# Patient Record
Sex: FEMALE | Race: WHITE | URBAN - METROPOLITAN AREA
[De-identification: names, ages, dates, MRNs, and addresses within clinical notes are randomized per-mention and may not be internally consistent; named-entity substitution may affect disease eponyms.]

---

## 2022-05-13 ENCOUNTER — OFFICE VISIT (OUTPATIENT)
Dept: URBAN - METROPOLITAN AREA CLINIC 26 | Facility: CLINIC | Age: 77
End: 2022-05-13
Payer: MEDICARE

## 2022-05-13 DIAGNOSIS — H52.4 PRESBYOPIA: ICD-10-CM

## 2022-05-13 DIAGNOSIS — H25.13 AGE-RELATED NUCLEAR CATARACT, BILATERAL: ICD-10-CM

## 2022-05-13 DIAGNOSIS — E11.9 TYPE 2 DIABETES MELLITUS W/O COMPLICATION: Primary | ICD-10-CM

## 2022-05-13 PROCEDURE — 92004 COMPRE OPH EXAM NEW PT 1/>: CPT | Performed by: OPTOMETRIST

## 2022-05-13 ASSESSMENT — KERATOMETRY
OD: 42.75
OS: 42.75

## 2022-05-13 ASSESSMENT — INTRAOCULAR PRESSURE
OS: 16
OD: 18

## 2022-05-13 ASSESSMENT — VISUAL ACUITY
OD: 20/25
OS: 20/25

## 2022-05-13 NOTE — IMPRESSION/PLAN
Impression: Age-related nuclear cataract, bilateral: H25.13. Plan: Recommend cataract surgery OU. Pt would like to wait because she is moving soon. f/u 3mns [place] in [place]/ [place].

## 2022-05-13 NOTE — IMPRESSION/PLAN
Impression: Presbyopia: H52.4. Plan: No Rx given. Continue with current glasses because of upcoming Cat sx.

## 2022-05-13 NOTE — IMPRESSION/PLAN
Impression: Type 2 diabetes mellitus w/o complication: P59.0. Plan: No signs of retinopathy or neovascularization noted. Discussed ocular and systemic benefits of blood sugar control. Continue blood sugar control.  RTC 1yr complete exam

## 2022-08-16 ENCOUNTER — OFFICE VISIT (OUTPATIENT)
Dept: URBAN - NONMETROPOLITAN AREA CLINIC 13 | Facility: CLINIC | Age: 77
End: 2022-08-16
Payer: MEDICARE

## 2022-08-16 DIAGNOSIS — E11.9 TYPE 2 DIABETES MELLITUS W/O COMPLICATION: ICD-10-CM

## 2022-08-16 DIAGNOSIS — H25.813 COMBINED FORMS OF AGE-RELATED CATARACT, BILATERAL: Primary | ICD-10-CM

## 2022-08-16 DIAGNOSIS — H43.813 VITREOUS DEGENERATION, BILATERAL: ICD-10-CM

## 2022-08-16 DIAGNOSIS — H04.123 DRY EYE SYNDROME OF BILATERAL LACRIMAL GLANDS: ICD-10-CM

## 2022-08-16 PROCEDURE — 99214 OFFICE O/P EST MOD 30 MIN: CPT | Performed by: OPTOMETRIST

## 2022-08-16 ASSESSMENT — INTRAOCULAR PRESSURE
OS: 15
OD: 16

## 2022-08-16 ASSESSMENT — VISUAL ACUITY
OD: 20/25
OS: 20/25

## 2022-08-16 NOTE — IMPRESSION/PLAN
Impression: Type 2 diabetes mellitus w/o complication: K14.0.  Plan: NO diabetic retinopathy noted ou, Letter to PCP, yearly exams are indicated

## 2022-09-23 ENCOUNTER — OFFICE VISIT (OUTPATIENT)
Dept: URBAN - NONMETROPOLITAN AREA CLINIC 13 | Facility: CLINIC | Age: 77
End: 2022-09-23
Payer: MEDICARE

## 2022-09-23 DIAGNOSIS — H25.13 AGE-RELATED NUCLEAR CATARACT, BILATERAL: Primary | ICD-10-CM

## 2022-09-23 DIAGNOSIS — E11.9 TYPE 2 DIABETES MELLITUS W/O COMPLICATION: ICD-10-CM

## 2022-09-23 DIAGNOSIS — H25.12 AGE-RELATED NUCLEAR CATARACT, LEFT EYE: ICD-10-CM

## 2022-09-23 DIAGNOSIS — H04.123 DRY EYE SYNDROME OF BILATERAL LACRIMAL GLANDS: ICD-10-CM

## 2022-09-23 PROCEDURE — 99204 OFFICE O/P NEW MOD 45 MIN: CPT | Performed by: OPHTHALMOLOGY

## 2022-09-23 ASSESSMENT — VISUAL ACUITY
OS: 20/25
OD: 20/25

## 2022-09-23 ASSESSMENT — INTRAOCULAR PRESSURE
OD: 15
OS: 16

## 2022-09-23 NOTE — IMPRESSION/PLAN
Impression: Type 2 diabetes mellitus w/o complication: M00.5.  Plan: NO diabetic retinopathy noted ou, Letter to PCP, yearly exams are indicated

## 2022-10-03 ENCOUNTER — TESTING ONLY (OUTPATIENT)
Dept: URBAN - NONMETROPOLITAN AREA CLINIC 13 | Facility: CLINIC | Age: 77
End: 2022-10-03
Payer: MEDICARE

## 2022-10-03 DIAGNOSIS — H25.13 AGE-RELATED NUCLEAR CATARACT, BILATERAL: Primary | ICD-10-CM

## 2022-10-03 DIAGNOSIS — Z01.818 ENCOUNTER FOR OTHER PREPROCEDURAL EXAMINATION: Primary | ICD-10-CM

## 2022-10-03 PROCEDURE — 99203 OFFICE O/P NEW LOW 30 MIN: CPT | Performed by: PHYSICIAN ASSISTANT

## 2022-10-03 PROCEDURE — 92025 CPTRIZED CORNEAL TOPOGRAPHY: CPT | Performed by: OPHTHALMOLOGY

## 2022-10-10 ENCOUNTER — SURGERY (OUTPATIENT)
Dept: URBAN - NONMETROPOLITAN AREA SURGERY 4 | Facility: SURGERY | Age: 77
End: 2022-10-10
Payer: MEDICARE

## 2022-10-10 DIAGNOSIS — H25.13 AGE-RELATED NUCLEAR CATARACT, BILATERAL: Primary | ICD-10-CM

## 2022-10-10 PROCEDURE — 66984 XCAPSL CTRC RMVL W/O ECP: CPT | Performed by: OPHTHALMOLOGY

## 2022-10-11 ENCOUNTER — POST-OPERATIVE VISIT (OUTPATIENT)
Dept: URBAN - NONMETROPOLITAN AREA CLINIC 13 | Facility: CLINIC | Age: 77
End: 2022-10-11
Payer: MEDICARE

## 2022-10-11 DIAGNOSIS — Z48.810 ENCOUNTER FOR SURGICAL AFTERCARE FOLLOWING SURGERY ON A SENSE ORGAN: Primary | ICD-10-CM

## 2022-10-11 PROCEDURE — 99024 POSTOP FOLLOW-UP VISIT: CPT | Performed by: OPTOMETRIST

## 2022-10-11 ASSESSMENT — INTRAOCULAR PRESSURE
OS: 15
OD: 19

## 2022-10-20 ENCOUNTER — POST-OPERATIVE VISIT (OUTPATIENT)
Dept: URBAN - NONMETROPOLITAN AREA CLINIC 14 | Facility: CLINIC | Age: 77
End: 2022-10-20
Payer: MEDICARE

## 2022-10-20 DIAGNOSIS — Z48.810 ENCOUNTER FOR SURGICAL AFTERCARE FOLLOWING SURGERY ON A SENSE ORGAN: Primary | ICD-10-CM

## 2022-10-20 PROCEDURE — 99024 POSTOP FOLLOW-UP VISIT: CPT | Performed by: OPTOMETRIST

## 2022-10-20 ASSESSMENT — INTRAOCULAR PRESSURE
OS: 7
OD: 8

## 2022-10-20 ASSESSMENT — VISUAL ACUITY: OD: 20/20

## 2022-10-20 NOTE — IMPRESSION/PLAN
Impression: S/P Cataract Extraction by phacoemulsification with IOL placement OD - 10 Days. Encounter for surgical aftercare following surgery on a sense organ  Z48.810. Plan: Doing well. Post op instructions discussed. Will RTC if any concerns arise.

## 2022-10-31 ENCOUNTER — SURGERY (OUTPATIENT)
Dept: URBAN - NONMETROPOLITAN AREA SURGERY 4 | Facility: SURGERY | Age: 77
End: 2022-10-31
Payer: MEDICARE

## 2022-10-31 DIAGNOSIS — H25.12 AGE-RELATED NUCLEAR CATARACT, LEFT EYE: Primary | ICD-10-CM

## 2022-10-31 PROCEDURE — 66984 XCAPSL CTRC RMVL W/O ECP: CPT | Performed by: OPHTHALMOLOGY

## 2022-11-01 ENCOUNTER — POST-OPERATIVE VISIT (OUTPATIENT)
Dept: URBAN - NONMETROPOLITAN AREA CLINIC 13 | Facility: CLINIC | Age: 77
End: 2022-11-01
Payer: MEDICARE

## 2022-11-01 DIAGNOSIS — Z48.810 ENCOUNTER FOR SURGICAL AFTERCARE FOLLOWING SURGERY ON A SENSE ORGAN: Primary | ICD-10-CM

## 2022-11-01 PROCEDURE — 99024 POSTOP FOLLOW-UP VISIT: CPT | Performed by: OPTOMETRIST

## 2022-11-01 ASSESSMENT — INTRAOCULAR PRESSURE
OD: 13
OS: 16

## 2022-11-08 ENCOUNTER — POST-OPERATIVE VISIT (OUTPATIENT)
Dept: URBAN - NONMETROPOLITAN AREA CLINIC 13 | Facility: CLINIC | Age: 77
End: 2022-11-08
Payer: MEDICARE

## 2022-11-08 DIAGNOSIS — Z48.810 ENCOUNTER FOR SURGICAL AFTERCARE FOLLOWING SURGERY ON THE SENSE ORGANS: Primary | ICD-10-CM

## 2022-11-08 PROCEDURE — 99024 POSTOP FOLLOW-UP VISIT: CPT | Performed by: OPTOMETRIST

## 2022-11-08 ASSESSMENT — VISUAL ACUITY
OD: 20/20
OS: 20/25

## 2022-11-08 ASSESSMENT — INTRAOCULAR PRESSURE
OS: 15
OD: 14

## 2022-11-08 NOTE — IMPRESSION/PLAN
Impression: S/P Cataract Extraction by phacoemulsification with IOL placement OS - 8 Days. Encounter for surgical aftercare following surgery on a sense organ  Z48.810.  Post operative instructions reviewed - Plan:

## 2022-12-01 ENCOUNTER — POST-OPERATIVE VISIT (OUTPATIENT)
Dept: URBAN - NONMETROPOLITAN AREA CLINIC 13 | Facility: CLINIC | Age: 77
End: 2022-12-01
Payer: MEDICARE

## 2022-12-01 DIAGNOSIS — Z48.810 ENCOUNTER FOR SURGICAL AFTERCARE FOLLOWING SURGERY ON A SENSE ORGAN: Primary | ICD-10-CM

## 2022-12-01 PROCEDURE — 99024 POSTOP FOLLOW-UP VISIT: CPT | Performed by: OPTOMETRIST

## 2022-12-01 ASSESSMENT — VISUAL ACUITY
OD: 20/20
OS: 20/20

## 2022-12-01 ASSESSMENT — INTRAOCULAR PRESSURE
OD: 14
OS: 12

## 2022-12-01 NOTE — IMPRESSION/PLAN
Impression: S/P Cataract Extraction by phacoemulsification with IOL placement OS - 31 Days. Encounter for surgical aftercare following surgery on a sense organ  Z48.130.  Plan:

## 2022-12-15 ENCOUNTER — POST-OPERATIVE VISIT (OUTPATIENT)
Dept: URBAN - NONMETROPOLITAN AREA CLINIC 13 | Facility: CLINIC | Age: 77
End: 2022-12-15
Payer: MEDICARE

## 2022-12-15 DIAGNOSIS — H52.4 PRESBYOPIA: Primary | ICD-10-CM

## 2022-12-15 DIAGNOSIS — Z48.810 ENCOUNTER FOR SURGICAL AFTERCARE FOLLOWING SURGERY ON A SENSE ORGAN: ICD-10-CM

## 2022-12-15 PROCEDURE — 99024 POSTOP FOLLOW-UP VISIT: CPT | Performed by: OPTOMETRIST

## 2022-12-15 ASSESSMENT — VISUAL ACUITY
OD: 20/20
OS: 20/25

## 2022-12-15 ASSESSMENT — INTRAOCULAR PRESSURE
OD: 13
OS: 12

## 2022-12-15 NOTE — IMPRESSION/PLAN
Impression:  Encounter for surgical aftercare following surgery on a sense organ  Z48.810.  Plan: S/P post cat sx, rx specs as noted, recheck in 6 months/prn

## 2023-06-19 ENCOUNTER — OFFICE VISIT (OUTPATIENT)
Dept: URBAN - NONMETROPOLITAN AREA CLINIC 13 | Facility: CLINIC | Age: 78
End: 2023-06-19
Payer: MEDICARE

## 2023-06-19 DIAGNOSIS — H04.123 DRY EYE SYNDROME OF BILATERAL LACRIMAL GLANDS: ICD-10-CM

## 2023-06-19 DIAGNOSIS — H52.4 PRESBYOPIA: ICD-10-CM

## 2023-06-19 DIAGNOSIS — E11.9 TYPE 2 DIABETES MELLITUS W/O COMPLICATION: Primary | ICD-10-CM

## 2023-06-19 DIAGNOSIS — Z96.1 PRESENCE OF INTRAOCULAR LENS: ICD-10-CM

## 2023-06-19 PROCEDURE — 99213 OFFICE O/P EST LOW 20 MIN: CPT | Performed by: OPTOMETRIST

## 2023-06-19 ASSESSMENT — VISUAL ACUITY
OD: 20/20
OS: 20/20

## 2023-06-19 ASSESSMENT — INTRAOCULAR PRESSURE
OD: 14
OS: 14

## 2023-06-19 NOTE — IMPRESSION/PLAN
Impression: Type 2 diabetes mellitus w/o complication: P82.9.  Plan: NO diabetic retinopathy noted ou, Letter to PCP, yearly exams are indicated